# Patient Record
Sex: FEMALE | Race: BLACK OR AFRICAN AMERICAN | Employment: UNEMPLOYED | URBAN - METROPOLITAN AREA
[De-identification: names, ages, dates, MRNs, and addresses within clinical notes are randomized per-mention and may not be internally consistent; named-entity substitution may affect disease eponyms.]

---
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
## 2018-04-19 LAB — GRBS, EXTERNAL: POSITIVE

## 2018-05-02 ENCOUNTER — HOSPITAL ENCOUNTER (EMERGENCY)
Age: 18
Discharge: HOME | End: 2018-05-02
Attending: OBSTETRICS & GYNECOLOGY | Admitting: OBSTETRICS & GYNECOLOGY
Payer: COMMERCIAL

## 2018-05-02 VITALS
WEIGHT: 179 LBS | HEART RATE: 96 BPM | SYSTOLIC BLOOD PRESSURE: 124 MMHG | BODY MASS INDEX: 30.56 KG/M2 | RESPIRATION RATE: 14 BRPM | TEMPERATURE: 97.4 F | HEIGHT: 64 IN | DIASTOLIC BLOOD PRESSURE: 74 MMHG

## 2018-05-02 PROBLEM — O47.1 FALSE LABOR AFTER 37 WEEKS OF GESTATION WITHOUT DELIVERY: Status: ACTIVE | Noted: 2018-05-02

## 2018-05-02 PROCEDURE — 99285 EMERGENCY DEPT VISIT HI MDM: CPT

## 2018-05-02 RX ORDER — VALACYCLOVIR HYDROCHLORIDE 500 MG/1
500 TABLET, FILM COATED ORAL 2 TIMES DAILY
COMMUNITY

## 2018-05-02 NOTE — IP AVS SNAPSHOT
Summary of Care Report The Summary of Care report has been created to help improve care coordination. Users with access to [facility] or [address] (Web-based application) may access additional patient information including the Discharge Summary. If you are not currently a [address] user and need more information, please call the number listed below in the [place] section and ask to be connected with Medical Records. Facility Information Name Address Phone [phone] [place] [id number] [phone] Patient Information Patient Name Sex  Lanie Torres ([id number]) Female 2000 Discharge Information Admitting Provider Service Area Unit Bill Araujo MD / Caitie Capone 15 3 Labor & Delivery / [phone] Discharge Provider Discharge Date/Time Discharge Disposition Destination (none) (none) (none) (none) Patient Language Language ENGLISH [13] Hospital Problems as of 2018  Reviewed: 3/29/2015  9:25 PM by Popeye Joe None Non-Hospital Problems as of 2018  Reviewed: 3/29/2015  9:25 PM by Popeye Joe Class Noted - Resolved Last Modified Active Problems Migraine headache  Unknown - Present 2011 by Marella Client, DO Entered by Marella Client, DO Dermatitis  2011 - Present 2011 by Marella Client, DO Entered by Marella Client, DO  
  Pain, upper back  2011 - Present 2011 by Marella Client, DO Entered by Marella Client, DO Overview Signed 2011  4:03 PM by Marella Client, DO  
   likley due to strain from breasts DUB (dysfunctional uterine bleeding)  2011 - Present 2011 by Marella Client, DO   Entered by Marella Client, DO  
 Overview Signed 2011  4:04 PM by Cesar Rivera, DO  
   possibly due to hormonal imbalance Pseudofolliculitis barbae  7135 - Present 3/29/2015 by Alma Avendano Entered by Alma Avendano You are allergic to the following No active allergies Current Discharge Medication List  
  
ASK your doctor about these medications Dose & Instructions Dispensing Information Comments  
 desonide 0.05 % topical ointment Commonly known as:  Mearl Gin Apply  to affected area two (2) times a day. For up to 2 weeks for itching Quantity:  15 g Refills:  0  
   
 ibuprofen 800 mg tablet Commonly known as:  MOTRIN Dose:  800 mg Take 1 Tab by mouth every eight (8) hours as needed for Pain. For migraines Quantity:  30 Tab Refills:  2  
   
 miconazole 2 % vaginal cream  
Commonly known as:  MONISTAT 7 Apply topically once a day for 7 days Quantity:  45 g Refills:  0  
   
 ondansetron 4 mg disintegrating tablet Commonly known as:  ZOFRAN ODT Dose:  4 mg Take 1 Tab by mouth every eight (8) hours as needed for Nausea. Quantity:  10 Tab Refills:  0  
   
 prenatal multivit-ca-min-fe-fa Tab Commonly known as:  PRENATAL VITAMIN Dose:  1 Tab Take 1 Tab by mouth daily. Take with food Quantity:  30 Tab Refills:  0 VALTREX 500 mg tablet Generic drug:  valACYclovir Dose:  500 mg Take 500 mg by mouth two (2) times a day. Refills:  0 Current Immunizations Name Date HPV (Quad) 2013 Follow-up Information None Discharge Instructions Week 39 of Your Pregnancy: Care Instructions Your Care Instructions During these final weeks, you may feel anxious to see your new baby. [place] babies often look different from what you see in pictures or movies. Right after birth, their heads may have a strange shape.  Their eyes may be puffy. And their genitals may be swollen. They may also have very dry skin, or red marks on the eyelids, nose, or neck. Still, most parents think their babies are beautiful. Follow-up care is a key part of your treatment and safety. Be sure to make and go to all appointments, and call your doctor if you are having problems. It's also a good idea to know your test results and keep a list of the medicines you take. How can you care for yourself at home? Prepare to breastfeed · If you are breastfeeding, continue to eat healthy foods. · Avoid alcohol, cigarettes, and drugs. This includes prescription and over-the-counter medicines. · You can help prevent sore nipples if you feed your baby in the correct position. Nurses will help you learn to do this. · Your  will need to be fed about every 1½ to 3 hours. Choose the right birth control after your baby is born · Women who are breastfeeding can still get pregnant. Use birth control if you don't want to get pregnant. · Intrauterine devices (IUDs) work for women who want to wait at least 2 years before getting pregnant again. They are safe to use while you are breastfeeding. · Depo-Provera can be used while you are breastfeeding. It is a shot you get every 3 months. · Birth control pills work well. But you need a different kind of pill while you are breastfeeding. And when you start taking these pills, you need to make sure to use another type of birth control until you start your second pack. · Diaphragms, cervical caps, tubal implants, and condoms with spermicide work less well after birth. If you have a diaphragm or cervical cap, you will need to have it refitted. · Tubal ligation (tying your tubes) and vasectomy are both permanent. These are good options if you are sure you are done having children. Where can you learn more? Go to http://hernán-rj.info/. Enter V267 in the search box to learn more about \"Week 39 of Your Pregnancy: Care Instructions. \" Current as of: March 16, 2017 Content Version: 11.4 © 4880-9645 [employer]. Care instructions adapted under license by [employer] (which disclaims liability or warranty for this information). If you have questions about a medical condition or this instruction, always ask your healthcare professional. [place] [address] any warranty or liability for your use of this information. Chart Review Routing History No Routing History on File

## 2018-05-02 NOTE — IP AVS SNAPSHOT
[address] [facility] [address] [place] [address] 
[phone] Patient: Robert Alvarez MRN: [id number] :2000 About your hospitalization You were admitted on:  N/A You last received care in the:  [facility] 3 LABOR & DELIVERY You were discharged on:  May 2, 2018 Why you were hospitalized Your primary diagnosis was:  Not on File Follow-up Information None Discharge Orders None A check cuco indicates which time of day the medication should be taken. My Medications ASK your doctor about these medications Instructions Each Dose to Equal  
 Morning Noon Evening Bedtime  
 desonide 0.05 % topical ointment Commonly known as:  Gale Renato Your last dose was: Your next dose is:    
   
   
 Apply  to affected area two (2) times a day. For up to 2 weeks for itching  
     
   
   
   
  
 ibuprofen 800 mg tablet Commonly known as:  MOTRIN Your last dose was: Your next dose is: Take 1 Tab by mouth every eight (8) hours as needed for Pain. For migraines 800 mg  
    
   
   
   
  
 miconazole 2 % vaginal cream  
Commonly known as:  MONISTAT 7 Your last dose was: Your next dose is:    
   
   
 Apply topically once a day for 7 days  
     
   
   
   
  
 ondansetron 4 mg disintegrating tablet Commonly known as:  ZOFRAN ODT Your last dose was: Your next dose is: Take 1 Tab by mouth every eight (8) hours as needed for Nausea. 4 mg  
    
   
   
   
  
 prenatal multivit-ca-min-fe-fa Tab Commonly known as:  PRENATAL VITAMIN Your last dose was: Your next dose is: Take 1 Tab by mouth daily. Take with food 1 Tab VALTREX 500 mg tablet Generic drug:  valACYclovir Your last dose was: Your next dose is: Take 500 mg by mouth two (2) times a day.   
 500 mg  
    
 Discharge Instructions Week 39 of Your Pregnancy: Care Instructions Your Care Instructions During these final weeks, you may feel anxious to see your new baby. Manor babies often look different from what you see in pictures or movies. Right after birth, their heads may have a strange shape. Their eyes may be puffy. And their genitals may be swollen. They may also have very dry skin, or red marks on the eyelids, nose, or neck. Still, most parents think their babies are beautiful. Follow-up care is a key part of your treatment and safety. Be sure to make and go to all appointments, and call your doctor if you are having problems. It's also a good idea to know your test results and keep a list of the medicines you take. How can you care for yourself at home? Prepare to breastfeed · If you are breastfeeding, continue to eat healthy foods. · Avoid alcohol, cigarettes, and drugs. This includes prescription and over-the-counter medicines. · You can help prevent sore nipples if you feed your baby in the correct position. Nurses will help you learn to do this. · Your  will need to be fed about every 1½ to 3 hours. Choose the right birth control after your baby is born · Women who are breastfeeding can still get pregnant. Use birth control if you don't want to get pregnant. · Intrauterine devices (IUDs) work for women who want to wait at least 2 years before getting pregnant again. They are safe to use while you are breastfeeding. · Depo-Provera can be used while you are breastfeeding. It is a shot you get every 3 months. · Birth control pills work well. But you need a different kind of pill while you are breastfeeding. And when you start taking these pills, you need to make sure to use another type of birth control until you start your second pack.  
· Diaphragms, cervical caps, tubal implants, and condoms with spermicide work less well after birth. If you have a diaphragm or cervical cap, you will need to have it refitted. · Tubal ligation (tying your tubes) and vasectomy are both permanent. These are good options if you are sure you are done having children. Where can you learn more? Go to http://hernán-rj.info/. Enter D900 in the search box to learn more about \"Week 39 of Your Pregnancy: Care Instructions. \" Current as of: March 16, 2017 Content Version: 11.4 © 2422-3407 [employer]. Care instructions adapted under license by [employer] (which disclaims liability or warranty for this information). If you have questions about a medical condition or this instruction, always ask your healthcare professional. [address] any warranty or liability for your use of this information. Introducing [facility]! [facility] introduces [employer] patient portal. Now you can access parts of your medical record, email your doctor's office, and request medication refills online. 1. In your internet browser, go to [email or web address] 2. Click on the First Time User? Click Here link in the Sign In box. You will see the New Member Sign Up page. 3. Enter your [employer] Access Code exactly as it appears below. You will not need to use this code after youve completed the sign-up process. If you do not sign up before the expiration date, you must request a new code. · [employer] Access Code: [email or web address] Expires: 7/9/2018  2:49 PM 
 
4. Enter the last four digits of your Social Security Number (xxxx) and Date of Birth (mm/dd/yyyy) as indicated and click Submit. You will be taken to the next sign-up page. 5. Create a TaoTaoSout ID. This will be your [employer] login ID and cannot be changed, so think of one that is secure and easy to remember. 6. Create a TaoTaoSout password. You can change your password at any time. 7. Enter your Password Reset Question and Answer. This can be used at a later time if you forget your password. 8. Enter your e-mail address. You will receive e-mail notification when new information is available in [address] 9. Click Sign Up. You can now view and download portions of your medical record. 10. Click the Download Summary menu link to download a portable copy of your medical information. If you have questions, please visit the Frequently Asked Questions section of the [employer] website. Remember, [employer] is NOT to be used for urgent needs. For medical emergencies, dial 911. Now available from your iPhone and Android! Introducing Branden Mantilla As a [employer] patient, I wanted to make you aware of our electronic visit tool called Branden Mantilla. [employer] 24/7 allows you to connect within minutes with a medical provider 24 hours a day, seven days a week via a mobile device or tablet or logging into a secure website from your computer. You can access Branden Mantilla from anywhere in the [place]. A virtual visit might be right for you when you have a simple condition and feel like you just dont want to get out of bed, or cant get away from work for an appointment, when your regular [employer] provider is not available (evenings, weekends or holidays), or when youre out of town and need minor care. Electronic visits cost only $49 and if the [employer] 24/7 provider determines a prescription is needed to treat your condition, one can be electronically transmitted to a nearby pharmacy*. Please take a moment to enroll today if you have not already done so. The enrollment process is free and takes just a few minutes. To enroll, please download the [employer] 24/7 alex to your tablet or phone, or visit [email or web address] to enroll on your computer.    
And, as an [address] [place] [address] patient with a [employer] account, the results of your visits will be scanned into your electronic medical record and your primary care provider will be able to view the scanned results. We urge you to continue to see your regular [facility] provider for your ongoing medical care. And while your primary care provider may not be the one available when you seek a Branden Tobinfin virtual visit, the peace of mind you get from getting a real diagnosis real time can be priceless. For more information on [employer], view our Frequently Asked Questions (FAQs) at [email or web address]. Sincerely, 
 
Dianah Sicard, MD 
Chief Medical Officer Leandra Cooley *:  certain medications cannot be prescribed via [employer] Providers Seen During Your Hospitalization Provider Specialty Primary office phone Jennie Carballo MD Obstetrics & Gynecology [phone] Makayla Farrell MD Obstetrics & Gynecology [phone] Your Primary Care Physician (PCP) Primary Care Physician Office Phone Office Fax NONE ** None ** ** None ** You are allergic to the following No active allergies Recent Documentation Height Weight Breastfeeding? BMI OB Status Smoking Status 1.626 m (45 %, Z= -0.12)* 81.2 kg (94 %, Z= 1.58)* No 30.73 kg/m2 (94 %, Z= 1.57)* Pregnant Light Tobacco Smoker *Growth percentiles are based on CDC 2-20 Years data. Emergency Contacts Name Discharge Info Relation Home Work Mobile PabloTim N/A  AT THIS TIME [6] Other Relative [6] [phone] [facility] N/A  AT THIS TIME [6] Parent [1] [phone] Patient Belongings The following personal items are in your possession at time of discharge: 
  Dental Appliances: None  Visual Aid: At bedside, Glasses      Home Medications: None   Jewelry: Body Piercing, With patient  Clothing: At bedside    Other Valuables: None  Personal Items Sent to Safe: None Please provide this summary of care documentation to your next provider. Signatures-by signing, you are acknowledging that this After Visit Summary has been reviewed with you and you have received a copy. Patient Signature:  ____________________________________________________________ Date:  ____________________________________________________________  
  
Annabelle Pane Provider Signature:  ____________________________________________________________ Date:  ____________________________________________________________

## 2018-05-02 NOTE — H&P
Labor and Delivery Admission Note  5/2/2018    CC: More painful contractions    21 y.o., , female, G1 P 0 @ 38 6/7 wks with Estimated Date of Delivery: 5/10/18 by dates and US presents at 1522 with report of contractions. She states contractions started a couple of days ago but then increased today starting around 11. They are still irregular, but initially were q 2 hours, then 1, now 5-13 minutes. She has had no LOF, VB, decreased FM or changes in her discharge. This pregnancy has been supervised by Dr. Baljeet Louise and has been uncomplicated with exception of history HSV with outbreak @ 35 wks. She has been on Valtrex since 36 weeks and has not had any outbreaks or prodromal symptoms since. PNC: Blood type: O            RH: pos            Ab screen: negative            Rubella: immune            RPR: nonreactive            1 hr GTT: 75            HIV: non reactive            Hgb electrophoresis: AA            GC/Chlamydia: negative            Urine culture: E. Coli (9/14/17)            SVII serology: positive history             GBS status: positive    PMH:  H/o seizure @ 6yo x 1; no diagnosis of epilepsy or need for treatment; thought to be stress related    PSH:  Adenoidectomy            Right finger fracture repair    OB:  G1 current    GYN:  H/o HSV; no previous paps due to age    SH:   Former smoker, quit at start of pregnancy; denies alcohol and drug use      Past Medical History:   Diagnosis Date    Back pain     Epilepsy ([address]     childhood    Fracture of finger of right hand 2014    Genital herpes     Herpes gestationis     outbreak at 35 weeks    Large breasts     Migraine headache     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 Meds:  Valtrex q day              PNV    Allergies: No Known Allergies     Pertinent ROS: as per HPI o/w negative     Family History   Problem Relation Age of Onset    Migraines Mother     Hypertension Maternal Grandmother     Diabetes Maternal Grandmother     Hypertension Paternal Grandmother     Psychiatric Disorder Paternal Aunt      Social History     Social History    Marital status: SINGLE     Spouse name: N/A    Number of children: N/A    Years of education: N/A     Occupational History    Not on file. Social History Main Topics    Smoking status: Light Tobacco Smoker    Smokeless tobacco: Current User      Comment: lives with smoker grandma x 1 yr    Alcohol use No    Drug use: No    Sexual activity: No     Other Topics Concern    Not on file     Social History Narrative       OBJECTIVE:  Gravid , female NAD  No data recorded. Visit Vitals    Ht 5' 4\" (1.626 m)    Wt 81.2 kg (179 lb)    LMP 06/30/2017    Breastfeeding No    BMI 30.73 kg/m2       Labs:  No results found for: WBC    Exam:  HEENT:  normal   Lungs:  clear  Cor:  RRR  Abdomen:  Soft, gravid, nontender; EFW 7#  Fetal heart rate tracing:  Baseline 130, moderate variability, accels present, decels absent  Contraction pattern: irregular, q 3-10 minutes  Cervix:  1/60/-2/soft/Vtx (essentially unchanged from the office)  Fluid:  Intact  SPEC EXAM:  No HSV lesions seen  Pelvimetry:  AP-good                      Arch- adequate                      Sidewalls- adequate                      Pelvis feels adequate for fetus. Impression:  IUP at 38 6/7 weeks with irregular contractions; no evidence of labor; HSV without outbreaks.     Plan: Discharge home           Labor precautions     Irving Archer MD

## 2018-05-02 NOTE — PROGRESS NOTES
1522: Assumed care of patient. Patient ambulatory to room 7. She complains of contractions that started a few days ago but have gotten stronger and closer together since 1100 today. Placed on monitors. Patient of Dr. Amelia Marcus. Spoke with Dr. uSzanne Ramirez who has asked that Dr. Paradise Pineda assume care of patient. Prenatal labs received from office. 1612: Dr. Paradise Pineda at bedside discussing [address] [facility] [address].

## 2018-05-08 ENCOUNTER — HOSPITAL ENCOUNTER (INPATIENT)
Age: 18
LOS: 3 days | Discharge: HOME | End: 2018-05-11
Attending: OBSTETRICS & GYNECOLOGY | Admitting: OBSTETRICS & GYNECOLOGY
Payer: COMMERCIAL

## 2018-05-08 DIAGNOSIS — R52 PAIN: Primary | ICD-10-CM

## 2018-05-08 PROBLEM — O47.9 UTERINE CONTRACTIONS DURING PREGNANCY: Status: ACTIVE | Noted: 2018-05-08

## 2018-05-08 LAB
ERYTHROCYTE [DISTWIDTH] IN BLOOD BY AUTOMATED COUNT: 13.2 % (ref 11.5–14.5)
HCT VFR BLD AUTO: 35.1 % (ref 35–47)
HGB BLD-MCNC: 12.1 G/DL (ref 11.5–16)
MCH RBC QN AUTO: 32.5 PG (ref 26–34)
MCHC RBC AUTO-ENTMCNC: 34.5 G/DL (ref 30–36.5)
MCV RBC AUTO: 94.4 FL (ref 80–99)
NRBC # BLD: 0 K/UL (ref 0–0.01)
NRBC BLD-RTO: 0 PER 100 WBC
PLATELET # BLD AUTO: 166 K/UL (ref 150–400)
PMV BLD AUTO: 11.4 FL (ref 8.9–12.9)
RBC # BLD AUTO: 3.72 M/UL (ref 3.8–5.2)
WBC # BLD AUTO: 9.7 K/UL (ref 3.6–11)

## 2018-05-08 PROCEDURE — 74011250636 HC RX REV CODE- 250/636: Performed by: OBSTETRICS & GYNECOLOGY

## 2018-05-08 PROCEDURE — 65270000029 HC RM PRIVATE

## 2018-05-08 PROCEDURE — 10907ZC DRAINAGE OF AMNIOTIC FLUID, THERAPEUTIC FROM PRODUCTS OF CONCEPTION, VIA NATURAL OR ARTIFICIAL OPENING: ICD-10-PCS | Performed by: OBSTETRICS & GYNECOLOGY

## 2018-05-08 PROCEDURE — 75410000002 HC LABOR FEE PER 1 HR

## 2018-05-08 PROCEDURE — 36415 COLL VENOUS BLD VENIPUNCTURE: CPT | Performed by: OBSTETRICS & GYNECOLOGY

## 2018-05-08 PROCEDURE — 85027 COMPLETE CBC AUTOMATED: CPT | Performed by: OBSTETRICS & GYNECOLOGY

## 2018-05-08 PROCEDURE — 74011000258 HC RX REV CODE- 258: Performed by: OBSTETRICS & GYNECOLOGY

## 2018-05-08 PROCEDURE — 99285 EMERGENCY DEPT VISIT HI MDM: CPT

## 2018-05-08 RX ORDER — SODIUM CHLORIDE 0.9 % (FLUSH) 0.9 %
SYRINGE (ML) INJECTION
Status: DISPENSED
Start: 2018-05-08 | End: 2018-05-09

## 2018-05-08 RX ORDER — FENTANYL CITRATE 50 UG/ML
100 INJECTION, SOLUTION INTRAMUSCULAR; INTRAVENOUS
Status: DISCONTINUED | OUTPATIENT
Start: 2018-05-08 | End: 2018-05-09 | Stop reason: HOSPADM

## 2018-05-08 RX ORDER — TERBUTALINE SULFATE 1 MG/ML
0.25 INJECTION SUBCUTANEOUS AS NEEDED
Status: DISCONTINUED | OUTPATIENT
Start: 2018-05-08 | End: 2018-05-09 | Stop reason: HOSPADM

## 2018-05-08 RX ORDER — NALBUPHINE HYDROCHLORIDE 10 MG/ML
10 INJECTION, SOLUTION INTRAMUSCULAR; INTRAVENOUS; SUBCUTANEOUS
Status: DISCONTINUED | OUTPATIENT
Start: 2018-05-08 | End: 2018-05-09 | Stop reason: HOSPADM

## 2018-05-08 RX ADMIN — PENICILLIN G POTASSIUM 2.5 MILLION UNITS: 20000000 POWDER, FOR SOLUTION INTRAVENOUS at 19:23

## 2018-05-08 RX ADMIN — SODIUM CHLORIDE 5 MILLION UNITS: 900 INJECTION, SOLUTION INTRAVENOUS at 15:39

## 2018-05-08 RX ADMIN — PENICILLIN G POTASSIUM 2.5 MILLION UNITS: 20000000 POWDER, FOR SOLUTION INTRAVENOUS at 23:21

## 2018-05-08 NOTE — IP AVS SNAPSHOT
[address] [place] [address] [address] [place] 
[phone] Patient: Milady Pinedo MRN: [id number] :2000 About your hospitalization You were admitted on: May 8, 2018 You last received care in the:  [address] [facility] You were discharged on:  May 11, 2018 Why you were hospitalized Your primary diagnosis was:  Uterine Contractions During Pregnancy Your diagnoses also included:  Pain Follow-up Information Follow up With Details Comments Contact Info Angle Recinos MD Schedule an appointment as soon as possible for a visit in 2 weeks Routine postpartum follow-up appointment [address] [place] [address], [address] [employer] [address] [place] 
[phone] Discharge Orders None A check cuco indicates which time of day the medication should be taken. My Medications START taking these medications Instructions Each Dose to Equal  
 Morning Noon Evening Bedtime  
 ibuprofen 800 mg tablet Commonly known as:  MOTRIN Your last dose was: Your next dose is: Take 1 Tab by mouth every eight (8) hours. Indications: perineal pain 800 mg  
    
   
   
   
  
 oxyCODONE-acetaminophen 5-325 mg per tablet Commonly known as:  PERCOCET Your last dose was: Your next dose is: Take 1 Tab by mouth every four (4) hours as needed for Pain. Max Daily Amount: 6 Tabs. Indications: Pain, postpartum 1 Tab CONTINUE taking these medications Instructions Each Dose to Equal  
 Morning Noon Evening Bedtime  
 prenatal multivit-ca-min-fe-fa Tab Commonly known as:  PRENATAL VITAMIN Your last dose was: Your next dose is: Take 1 Tab by mouth daily. Take with food 1 Tab VALTREX 500 mg tablet Generic drug:  valACYclovir Your last dose was: Your next dose is: Take 500 mg by mouth two (2) times a day. 500 mg Where to Get Your Medications Information on where to get these meds will be given to you by the nurse or doctor. ! Ask your nurse or doctor about these medications  
  ibuprofen 800 mg tablet  
 oxyCODONE-acetaminophen 5-325 mg per tablet Opioid Education Prescription Opioids: What You Need to Know: 
 
Prescription opioids can be used to help relieve moderate-to-severe pain and are often prescribed following a surgery or injury, or for certain health conditions. These medications can be an important part of treatment but also come with serious risks. Opioids are strong pain medicines. Examples include hydrocodone, oxycodone, fentanyl, and morphine. Heroin is an example of an illegal opioid. It is important to work with your health care provider to make sure you are getting the safest, most effective care. WHAT ARE THE RISKS AND SIDE EFFECTS OF OPIOID USE? Prescription opioids carry serious risks of addiction and overdose, especially with prolonged use. An opioid overdose, often marked by slow breathing, can cause sudden death. The use of prescription opioids can have a number of side effects as well, even when taken as directed. · Tolerance-meaning you might need to take more of a medication for the same pain relief · Physical dependence-meaning you have symptoms of withdrawal when the medication is stopped. Withdrawal symptoms can include nausea, sweating, chills, diarrhea, stomach cramps, and muscle aches. Withdrawal can last up to several weeks, depending on which drug you took and how long you took it. · Increased sensitivity to pain · Constipation · Nausea, vomiting, and dry mouth · Sleepiness and dizziness · Confusion · Depression · Low levels of testosterone that can result in lower sex drive, energy, and strength · Itching and sweating RISKS ARE GREATER WITH:      
· History of drug misuse, substance use disorder, or overdose · Mental health conditions (such as depression or anxiety) · Sleep apnea · Older age (72 years or older) · Pregnancy Avoid alcohol while taking prescription opioids. Also, unless specifically advised by your health care provider, medications to avoid include: · Benzodiazepines (such as Xanax or Valium) · Muscle relaxants (such as Soma or Flexeril) · Hypnotics (such as Ambien or Lunesta) · Other prescription opioids KNOW YOUR OPTIONS Talk to your health care provider about ways to manage your pain that don't involve prescription opioids. Some of these options may actually work better and have fewer risks and side effects. Options may include: 
· Pain relievers such as acetaminophen, ibuprofen, and naproxen · Some medications that are also used for depression or seizures · Physical therapy and exercise · Counseling to help patients learn how to cope better with triggers of pain and stress. · Application of heat or cold compress · Massage therapy · Relaxation techniques Be Informed Make sure you know the name of your medication, how much and how often to take it, and its potential risks & side effects. IF YOU ARE PRESCRIBED OPIOIDS FOR PAIN: 
· Never take opioids in greater amounts or more often than prescribed. Remember the goal is not to be pain-free but to manage your pain at a tolerable level. · Follow up with your primary care provider to: · Work together to create a plan on how to manage your pain. · Talk about ways to help manage your pain that don't involve prescription opioids. · Talk about any and all concerns and side effects. · Help prevent misuse and abuse. · Never sell or share prescription opioids · Help prevent misuse and abuse. · Store prescription opioids in a secure place and out of reach of others (this may include visitors, children, friends, and family). · Safely dispose of unused/unwanted prescription opioids: Find your community drug take-back program or your pharmacy mail-back program, or flush them down the toilet, following guidance from the Food and Drug Administration (www.fda.gov/Drugs/ResourcesForYou). · Visit www.cdc.gov/drugoverdose to learn about the risks of opioid abuse and overdose. · If you believe you may be struggling with addiction, tell your health care provider and ask for guidance or call [employer] at 0-012-009-MLZJ. Discharge Instructions POSTPARTUM DISCHARGE INSTRUCTIONS Name:  Samantha Villegas YOB: 2000 Admission Diagnosis:  Labor, prolonged latent phase Discharge Diagnosis:   
Problem List as of 5/11/2018  Date Reviewed: 5/8/2018 Codes Class Noted - Resolved Pain ICD-10-CM: R52 ICD-9-CM: 780.96  5/11/2018 - Present Overview Signed 5/11/2018  9:46 AM by Moises Suggs MD  
  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  
  likley due to strain from breasts DUB (dysfunctional uterine bleeding) ICD-10-CM: N93.8 ICD-9-CM: 626.8  6/28/2011 - Present Overview Signed 6/28/2011  4:04 PM by Baltazar Mcdonnell DO  
  possibly due to hormonal imbalance Attending Physician:  Ceferino Marshall MD 
 
 Delivery Type:  Vaginal Childbirth with Episiotomy, Laceration or Tear: What To Expect At [address] [facility] Your body will slowly heal in the next few weeks. It is easy to get too tired and overwhelmed during the first weeks after your baby is born. Changes in your hormones can shift your mood without warning. You may find it hard to meet the extra demands on your energy and time. Take it easy on yourself. Follow-up care is a key part of your treatment and safety. Be sure to make and go to all appointments, and call your doctor if you are having problems. It's also a good idea to know your test results and keep a list of the medicines you take. How can you care for yourself at home? Vaginal Bleeding and Cramps · After delivery, you will have a bloody discharge from the vagina. This will turn pink within a week and then white or yellow after about 10 days. It may last for 2 to 4 weeks or longer, until the uterus has healed. Use pads instead of tampons until you stop bleeding. · Do not worry if you pass some blood clots, as long as they are smaller than a golf ball. If you have a tear or stitches in your vaginal area, change the pad at least every 4 hours to prevent soreness and infection. · You may have cramps for the first few days after childbirth. These are normal and occur as the uterus shrinks to normal size. Take an over-the-counter pain medicine, such as acetaminophen (Tylenol), ibuprofen (Advil, Motrin), or naproxen (Aleve), for cramps. Read and follow all instructions on the label. Do not take aspirin, because it can cause more bleeding. Do not take acetaminophen (Tylenol) and other acetaminophen containing medications (i.e. Percocet) at the same time. Episiotomy, Lacerations or Tears · If you have stitches, they will dissolve on their own and do not need to be removed.   
· Put ice or a cold pack on your painful area for 10 to 20 minutes at a time, several times a day, for the first few days. Put a thin cloth between the ice and your skin. · Sit in a few inches of warm water (sitz bath) 3 times a day and after bowel movements. The warm water helps with pain and itching. If you do not have a tub, a warm shower might help. Breast fullness · Your breasts may overfill (engorge) in the first few days after delivery. To help milk flow and to relieve pain, warm your breasts in the shower or by using warm, moist towels before nursing. · If you are not nursing, do not put warmth on your breasts or touch your breasts. Wear a tight bra or sports bra and use ice until the fullness goes away. This usually takes 2 to 3 days. · Put ice or a cold pack on your breast after nursing to reduce swelling and pain. Put a thin cloth between the ice and your skin. Activity · Eat a balanced diet. Do not try to lose weight by cutting calories. Keep taking your prenatal vitamins, or take a multivitamin. · Get as much rest as you can. Try to take naps when your baby sleeps during the day. · Get some exercise every day. But do not do any heavy exercise until your doctor says it is okay. · Wait until you are healed (about 4 to 6 weeks) before you have sexual intercourse. Your doctor will tell you when it is okay to have sex. · Talk to your doctor about birth control. You can get pregnant even before your period returns. Also, you can get pregnant while you are breast-feeding. Mental Health · Many women get the \"baby blues\" during the first few days after childbirth. You may lose sleep, feel irritable, and cry easily. You may feel happy one minute and sad the next. Hormone changes are one cause of these emotional changes. Also, the demands of a new baby, along with visits from relatives or other family needs, add to a mother's stress. The \"baby blues\" often peak around the fourth day. Then they ease up in less than 2 weeks. · If your moodiness or anxiety lasts for more than 2 weeks, or if you feel like life is not worth living, you may have postpartum depression. This is different for each mother. Some mothers with serious depression may worry intensely about their infant's well-being. Others may feel distant from their child. Some mothers might even feel that they might harm their baby. A mother may have signs of paranoia, wondering if someone is watching her. · With all the changes in your life, you may not know if you are depressed. Pregnancy sometimes causes changes in how you feel that are similar to the symptoms of depression. · Symptoms of depression include: · Feeling sad or hopeless and losing interest in daily activities. These are the most common symptoms of depression. · Sleeping too much or not enough. · Feeling tired. You may feel as if you have no energy. · Eating too much or too little. · POSTPARTUM SUPPORT INTERNATIONAL (PSI) offers a Warm line; Chat with the Expert phone sessions; Information and Articles about Pregnancy and Postpartum Mood Disorders; Comprehensive List of Free Support Groups; Knowledgeable local coordinators who will offer support, information, and resources; Guide to Resources on [place]; Calendar of events in the  mood disorders community; Latest News and Research; and [facility] [address] for [employer]. Remember - You are not alone; You are not to blame; With help, you will be well. 4-521-979-PPD(2371). WWW. POSTPARTUM. NET · Writing or talking about death, such as writing suicide notes or talking about guns, knives, or pills. Keep the numbers for these national suicide hotlines: 1-254-503-TALK (9-571.395.8722) and 5-825-BXZNWOF (3-115-073-738.604.3412). If you or someone you know talks about suicide or feeling hopeless, get help right away. Constipation and Hemorrhoids Drink plenty of fluids, enough so that your urine is light yellow or clear like water. If you have kidney, heart, or liver disease and have to limit fluids, talk with your doctor before you increase the amount of fluids you drink. · Eat plenty of fiber each day. Have a bran muffin or bran cereal for breakfast, and try eating a piece of fruit for a mid-afternoon snack. · For painful, itchy hemorrhoids, put ice or a cold pack on the area several times a day for 10 minutes at a time. Follow this by putting a warm compress on the area for another 10 to 20 minutes or by sitting in a shallow, warm bath. When should you call for help? Call 911 anytime you think you may need emergency care. For example, call if: 
· You are thinking of hurting yourself, your baby, or anyone else. · You passed out (lost consciousness). · You have symptoms of a blood clot in your lung (called a pulmonary embolism). These may include: 
· Sudden chest pain. · Trouble breathing. · Coughing up blood. Call your doctor now or seek immediate medical care if: 
· You have severe vaginal bleeding. · You are soaking through a pad each hour for 2 or more hours. · Your vaginal bleeding seems to be getting heavier or is still bright red 4 days after delivery. · You are dizzy or lightheaded, or you feel like you may faint. · You are vomiting or cannot keep fluids down. · You have a fever. · You have new or more belly pain. · You pass tissue (not just blood). · Your vaginal discharge smells bad. · Your belly feels tender or full and hard. · Your breasts are continuously painful or red. · You feel sad, anxious, or hopeless for more than a few days. · You have sudden, severe pain in your belly. · You have symptoms of a blood clot in your leg (called a deep vein thrombosis), such as: 
· Pain in your calf, back of the knee, thigh, or groin. · Redness and swelling in your leg or groin. · You have symptoms of preeclampsia, such as: 
· Sudden swelling of your face, hands, or feet. · New vision problems (such as dimness or blurring). · A severe headache. · Your blood pressure is higher than it should be or rises suddenly. · You have new nausea or vomiting. Watch closely for changes in your health, and be sure to contact your doctor if you have any problems. Additional Information:  Postpartum Support PARENTS:  Are you feeling sad or depressed? Is it difficult for you to enjoy yourself? Do you feel more irritable or tense? Do you feel anxious or panicky? Are you having difficulty bonding with your baby? Do you feel as if you are \"out of control\" or \"going crazy\"? Are you worried that you might hurt your baby or yourself? FAMILIES: Do you worry that something is wrong but don't know how to help? Do you think that your partner or spouse is having problems coping? Are you worried that it may never get better? While many women experience some mild mood change or \"the blues\" during or after the birth of a child, 1 in 9 women experience more significant symptoms of depression or anxiety. 1 in 10 Dads become depressed during the first year. Things you can do Being a good parent includes taking care of yourself. If you take care of yourself, you will be able to take better care of your baby and your family. · Talk to a counselor or healthcare provider who has training in  mood and anxiety problems. · Learn as much as you can about pregnancy and postpartum depression and anxiety. · Get support from family and friends. Ask for help when you need it. · Join a support group in your area or online. · Keep active by walking, stretching or whatever form of exercise helps you to feel better. · Get enough rest and time for yourself. · Eat a healthy diet. · Don't give up! It may take more than one try to get the right help you need. These are general instructions for a healthy lifestyle: No smoking/ No tobacco products/ Avoid exposure to second hand smoke Surgeon General's Warning:  Quitting smoking now greatly reduces serious risk to your health. Obesity, smoking, and sedentary lifestyle greatly increases your risk for illness A healthy diet, regular physical exercise & weight monitoring are important for maintaining a healthy lifestyle Recognize signs and symptoms of STROKE: 
 
F-face looks uneven A-arms unable to move or move unevenly S-speech slurred or non-existent T-time-call 911 as soon as signs and symptoms begin - DO NOT go  
    back to bed or wait to see if you get better - TIME IS BRAIN. I have had the opportunity to make my options or choices for discharge. I have received and understand these instructions. "[employer] Announcement We are excited to announce that we are making your provider's discharge notes available to you in "[employer]. You will see these notes when they are completed and signed by the physician that discharged you from your recent hospital stay. If you have any questions or concerns about any information you see in "[employer] please call the Health Information Department where you were seen or reach out to your Primary Care Provider for more information about your plan of care. Introducing [facility]! Anjelica Astudillo introduces "[employer] patient portal. Now you can access parts of your medical record, email your doctor's office, and request medication refills online. 1. In your internet browser, go to [email or web address] 2. Click on the First Time User? Click Here link in the Sign In box. You will see the New Member Sign Up page. 3. Enter your "[employer] Access Code exactly as it appears below. You will not need to use this code after youve completed the sign-up process. If you do not sign up before the expiration date, you must request a new code. · "[employer] Access Code: [email or web address] Expires: 7/9/2018  2:49 PM 
 
 4. Enter the last four digits of your Social Security Number (xxxx) and Date of Birth (mm/dd/yyyy) as indicated and click Submit. You will be taken to the next sign-up page. 5. Create a GoChime ID. This will be your [employer] login ID and cannot be changed, so think of one that is secure and easy to remember. 6. Create a [employer] password. You can change your password at any time. 7. Enter your Password Reset Question and Answer. This can be used at a later time if you forget your password. 8. Enter your e-mail address. You will receive e-mail notification when new information is available in [address] 9. Click Sign Up. You can now view and download portions of your medical record. 10. Click the Download Summary menu link to download a portable copy of your medical information. If you have questions, please visit the Frequently Asked Questions section of the [employer] website. Remember, [employer] is NOT to be used for urgent needs. For medical emergencies, dial 911. Now available from your iPhone and Android! Introducing Branden Mantilla As a [facility] patient, I wanted to make you aware of our electronic visit tool called [facility]. [facility] 24/7 allows you to connect within minutes with a medical provider 24 hours a day, seven days a week via a mobile device or tablet or logging into a secure website from your computer. You can access Branden Mantilla from anywhere in the [place]. A virtual visit might be right for you when you have a simple condition and feel like you just dont want to get out of bed, or cant get away from work for an appointment, when your regular [facility] provider is not available (evenings, weekends or holidays), or when youre out of town and need minor care.   Electronic visits cost only $49 and if the Branden Mantilla provider determines a prescription is needed to treat your condition, one can be electronically transmitted to a nearby pharmacy*. Please take a moment to enroll today if you have not already done so. The enrollment process is free and takes just a few minutes. To enroll, please download the [facility] 24/7 alex to your tablet or phone, or visit [email or web address] to enroll on your computer. And, as an [address] [place] [address] patient with a [employer] account, the results of your visits will be scanned into your electronic medical record and your primary care provider will be able to view the scanned results. We urge you to continue to see your regular [facility] provider for your ongoing medical care. And while your primary care provider may not be the one available when you seek a [facility] virtual visit, the peace of mind you get from getting a real diagnosis real time can be priceless. For more information on [facility], view our Frequently Asked Questions (FAQs) at [email or web address]. Sincerely, 
 
Ignacia Verma MD 
Chief Medical Officer [facility] Crystal Yasir *:  certain medications cannot be prescribed via [facility] Providers Seen During Your Hospitalization Provider Specialty Primary office phone Miriam Mittal MD Obstetrics & Gynecology [phone] Your Primary Care Physician (PCP) Primary Care Physician Office Phone Office Fax Nirmal Cheung [phone] You are allergic to the following No active allergies Recent Documentation Height Weight Breastfeeding? BMI OB Status Smoking Status 1.651 m (61 %, Z= 0.27)* 81.2 kg (94 %, Z= 1.57)* Unknown 29.79 kg/m2 (93 %, Z= 1.47)* Recent pregnancy Light Tobacco Smoker *Growth percentiles are based on CDC 2-20 Years data. Emergency Contacts Name Discharge Info Relation Home Work Mobile Tim Shah (bozena) YES [1] Other Relative [6] [phone] Atilio Cordova (Mother) YES [1] Parent [1] [phone] Oval Ricks  Boyfriend [17]   [phone] Patient Belongings The following personal items are in your possession at time of discharge: 
  Dental Appliances: None  Visual Aid: None      Home Medications: None   Jewelry: None  Clothing: At bedside    Other Valuables: At bedside  Personal Items Sent to Safe: None Please provide this summary of care documentation to your next provider. Signatures-by signing, you are acknowledging that this After Visit Summary has been reviewed with you and you have received a copy. Patient Signature:  ____________________________________________________________ Date:  ____________________________________________________________  
  
[facility] Provider Signature:  ____________________________________________________________ Date:  ____________________________________________________________

## 2018-05-08 NOTE — PROGRESS NOTES
1130: Assumed care of patient. Patient is here with complaints of contractions that have gotten stronger and are happening every 5 minutes. Denies a \"gush if fluid\" but states she is having a clear discharge. Fetal movement is present. 1220: Dr. Paradise Pineda at bedside discussing [address]. Plan is for patient to ambulate for 2 hours and recheck cervix at 1430. Ultrasound confirmed vertex. 1226: Patient off monitors to ambulate. 1430: Patient back from walk. Placed on monitors. Will let Dr. Paradise Pineda know. 1452: Dr. Paradise Pineda at bedside. Plan to admit patient and start GBS prophylaxis. 1551: On birthing ball    1622: Off monitors to ambulate. 1830: Patient ambulating around unit. 1930: Bedside, Verbal and Written shift change report given to Raysa Dunaway RN (oncoming nurse) by ROLANDO Fleming RN (offgoing nurse). Report included the following information SBAR, MAR and Recent Results.

## 2018-05-08 NOTE — IP AVS SNAPSHOT
[address] 
[phone] Patient: Ninfa Driver MRN: [id number] :2000 A check cuco indicates which time of day the medication should be taken. My Medications START taking these medications Instructions Each Dose to Equal  
 Morning Noon Evening Bedtime  
 ibuprofen 800 mg tablet Commonly known as:  MOTRIN Your last dose was: Your next dose is: Take 1 Tab by mouth every eight (8) hours. Indications: perineal pain 800 mg  
    
   
   
   
  
 oxyCODONE-acetaminophen 5-325 mg per tablet Commonly known as:  PERCOCET Your last dose was: Your next dose is: Take 1 Tab by mouth every four (4) hours as needed for Pain. Max Daily Amount: 6 Tabs. Indications: Pain, postpartum 1 Tab CONTINUE taking these medications Instructions Each Dose to Equal  
 Morning Noon Evening Bedtime  
 prenatal multivit-ca-min-fe-fa Tab Commonly known as:  PRENATAL VITAMIN Your last dose was: Your next dose is: Take 1 Tab by mouth daily. Take with food 1 Tab VALTREX 500 mg tablet Generic drug:  valACYclovir Your last dose was: Your next dose is: Take 500 mg by mouth two (2) times a day. 500 mg Where to Get Your Medications Information on where to get these meds will be given to you by the nurse or doctor. ! Ask your nurse or doctor about these medications  
  ibuprofen 800 mg tablet  
 oxyCODONE-acetaminophen 5-325 mg per tablet

## 2018-05-08 NOTE — H&P
Labor and Delivery Admission Note  5/8/2018    CC: Regular, painful contractios    21 y.o., , female, G1 P 0 @ 39 5/7 wks with Estimated Date of Delivery: 5/10/18 by dates and US presents at 1127 with report of irregular contractions since last night. They have become more intense and more regular this AM, every 5 minutes. She is feeling more pain in her back. She has active FM, no LOF, no VB, no discharge, no HSV outbreaks or prodromal symptoms. She is on Valtrex and has been taking it daily since 36 wks. Last outbreak @ 35 weeks. This pregnancy has been supervised by Dr. Kaylynn Aquino but patient has planned to deliver here @ [facility]. Reportedly was 2 cm last check in the office. PNC: Blood type: O            RH: pos            Ab screen: negative            Rubella: immune            RPR: nonreactive            1 hr GTT: 75            HIV: non reactive            Hgb electrophoresis: AA            GC/Chlamydia: negative            Urine culture: E. Coli (9/14/17)            SVII serology: positive history             GBS status: positive    Past Medical History:   Diagnosis Date    Back pain     Epilepsy ([address]     childhood    Fracture of finger of right hand 2014    Genital herpes     Herpes gestationis     outbreak at 35 weeks    Large breasts     Migraine headache     Pseudofolliculitis barbae 2/74/9257    Seizure ([address] 2008     Past Surgical History:   Procedure Laterality Date    HX ADENOIDECTOMY      HX OTHER SURGICAL      orthopedic (hand surgery)    HX OTHER SURGICAL      adenoids removed     OB/GYN: G1 current  Meds:   No current facility-administered medications for this encounter.       Allergies: No Known Allergies  Pertinent ROS: as per HPI o/w negative     Family History   Problem Relation Age of Onset   [facility] Migraines Mother     Hypertension Maternal Grandmother     Diabetes Maternal Grandmother     Hypertension Paternal Grandmother     Psychiatric Disorder Paternal Aunt      Social History     Social History    Marital status: SINGLE     Spouse name: N/A    Number of children: N/A    Years of education: N/A     Occupational History    Not on file. Social History Main Topics    Smoking status: Light Tobacco Smoker    Smokeless tobacco: Current User      Comment: lives with smoker grandma x 1 yr    Alcohol use No    Drug use: No    Sexual activity: No     Other Topics Concern    Not on file     Social History Narrative       OBJECTIVE:  Gravid , female NAD  Temp (24hrs), Av.1 °F (36.2 °C), Min:97.1 °F (36.2 °C), Max:97.1 °F (36.2 °C)    Visit Vitals    /88 (BP 1 Location: Left arm, BP Patient Position: Sitting)    Pulse 70    Temp 97.1 °F (36.2 °C)    Resp 14    Ht 5' 5\" (1.651 m)    Wt 81.2 kg (179 lb)    LMP 2017    Breastfeeding No    BMI 29.79 kg/m2       Labs:  No results found for: WBC    Exam:  HEENT:  normal   Lungs:  Normal work of breathing  Cor:  Well perfused  Abdomen:  Soft, gravid, EFW 7.5-8#, nontender  Fetal heart rate tracing:  Baseline 135, moderate variability, accels present, decels absent  Contraction pattern: q 5 minutes   Cervix:  2/80/BBOW/Vertex confirmed by US  Fluid:  Intact  : no lesions noted  Pelvimetry:  AP-good                      Arch- adequate                      Sidewalls- adequate                      Pelvis feels adequate for fetus. Impression:  IUP at 39 5/7 weeks with contractions/early labor. Plan: Patient will ambulate x 2 hours and return for repeat cervical exam           Continue Valtrex           PCN for GBS if patient is in labor    Polo Brock MD     Addendum:  Patient has ambulated for 2 hours and returned with continued contractions and also some light vaginal spotting. Cervix is rechecked and is now 2-3/90/BBOW. Will admit and initiate PCN for known GBS positive status. Patient encouraged to continue to ambulate.

## 2018-05-09 ENCOUNTER — ANESTHESIA EVENT (OUTPATIENT)
Dept: LABOR AND DELIVERY | Age: 18
End: 2018-05-09
Payer: COMMERCIAL

## 2018-05-09 ENCOUNTER — ANESTHESIA (OUTPATIENT)
Dept: LABOR AND DELIVERY | Age: 18
End: 2018-05-09
Payer: COMMERCIAL

## 2018-05-09 PROCEDURE — 77030011943

## 2018-05-09 PROCEDURE — 0HQ9XZZ REPAIR PERINEUM SKIN, EXTERNAL APPROACH: ICD-10-PCS | Performed by: OBSTETRICS & GYNECOLOGY

## 2018-05-09 PROCEDURE — 74011250636 HC RX REV CODE- 250/636: Performed by: OBSTETRICS & GYNECOLOGY

## 2018-05-09 PROCEDURE — 75410000000 HC DELIVERY VAGINAL/SINGLE

## 2018-05-09 PROCEDURE — 75410000002 HC LABOR FEE PER 1 HR

## 2018-05-09 PROCEDURE — 76060000078 HC EPIDURAL ANESTHESIA

## 2018-05-09 PROCEDURE — 77030007880 HC KT SPN EPDRL BBMI -B

## 2018-05-09 PROCEDURE — A4300 CATH IMPL VASC ACCESS PORTAL: HCPCS

## 2018-05-09 PROCEDURE — 74011000250 HC RX REV CODE- 250

## 2018-05-09 PROCEDURE — 3E0R3NZ INTRODUCTION OF ANALGESICS, HYPNOTICS, SEDATIVES INTO SPINAL CANAL, PERCUTANEOUS APPROACH: ICD-10-PCS | Performed by: ANESTHESIOLOGY

## 2018-05-09 PROCEDURE — 74011250637 HC RX REV CODE- 250/637: Performed by: OBSTETRICS & GYNECOLOGY

## 2018-05-09 PROCEDURE — 77030014125 HC TY EPDRL BBMI -B: Performed by: ANESTHESIOLOGY

## 2018-05-09 PROCEDURE — 65410000002 HC RM PRIVATE OB

## 2018-05-09 PROCEDURE — 75410000003 HC RECOV DEL/VAG/CSECN EA 0.5 HR

## 2018-05-09 PROCEDURE — 74011250636 HC RX REV CODE- 250/636: Performed by: ANESTHESIOLOGY

## 2018-05-09 PROCEDURE — 74011250636 HC RX REV CODE- 250/636

## 2018-05-09 RX ORDER — FENTANYL/BUPIVACAINE/NS/PF 2-1250MCG
1-16 PREFILLED PUMP RESERVOIR EPIDURAL CONTINUOUS
Status: DISCONTINUED | OUTPATIENT
Start: 2018-05-09 | End: 2018-05-11 | Stop reason: HOSPADM

## 2018-05-09 RX ORDER — SODIUM CHLORIDE 0.9 % (FLUSH) 0.9 %
5-10 SYRINGE (ML) INJECTION AS NEEDED
Status: DISCONTINUED | OUTPATIENT
Start: 2018-05-09 | End: 2018-05-11 | Stop reason: HOSPADM

## 2018-05-09 RX ORDER — SIMETHICONE 80 MG
80 TABLET,CHEWABLE ORAL
Status: DISCONTINUED | OUTPATIENT
Start: 2018-05-09 | End: 2018-05-11 | Stop reason: HOSPADM

## 2018-05-09 RX ORDER — NALOXONE HYDROCHLORIDE 0.4 MG/ML
0.4 INJECTION, SOLUTION INTRAMUSCULAR; INTRAVENOUS; SUBCUTANEOUS AS NEEDED
Status: DISCONTINUED | OUTPATIENT
Start: 2018-05-09 | End: 2018-05-09 | Stop reason: HOSPADM

## 2018-05-09 RX ORDER — EPHEDRINE SULFATE 50 MG/ML
10 INJECTION, SOLUTION INTRAVENOUS
Status: DISCONTINUED | OUTPATIENT
Start: 2018-05-09 | End: 2018-05-09 | Stop reason: HOSPADM

## 2018-05-09 RX ORDER — MINERAL OIL
OIL (ML) ORAL
Status: DISPENSED
Start: 2018-05-09 | End: 2018-05-10

## 2018-05-09 RX ORDER — LIDOCAINE HYDROCHLORIDE AND EPINEPHRINE 15; 5 MG/ML; UG/ML
INJECTION, SOLUTION EPIDURAL AS NEEDED
Status: DISCONTINUED | OUTPATIENT
Start: 2018-05-09 | End: 2018-05-10 | Stop reason: HOSPADM

## 2018-05-09 RX ORDER — BUPIVACAINE HYDROCHLORIDE 5 MG/ML
30 INJECTION, SOLUTION EPIDURAL; INTRACAUDAL AS NEEDED
Status: DISCONTINUED | OUTPATIENT
Start: 2018-05-09 | End: 2018-05-09 | Stop reason: HOSPADM

## 2018-05-09 RX ORDER — IBUPROFEN 400 MG/1
800 TABLET ORAL EVERY 8 HOURS
Status: DISCONTINUED | OUTPATIENT
Start: 2018-05-09 | End: 2018-05-11 | Stop reason: HOSPADM

## 2018-05-09 RX ORDER — FENTANYL CITRATE 50 UG/ML
INJECTION, SOLUTION INTRAMUSCULAR; INTRAVENOUS AS NEEDED
Status: DISCONTINUED | OUTPATIENT
Start: 2018-05-09 | End: 2018-05-10 | Stop reason: HOSPADM

## 2018-05-09 RX ORDER — SODIUM CHLORIDE 0.9 % (FLUSH) 0.9 %
5-10 SYRINGE (ML) INJECTION EVERY 8 HOURS
Status: DISCONTINUED | OUTPATIENT
Start: 2018-05-09 | End: 2018-05-11 | Stop reason: HOSPADM

## 2018-05-09 RX ORDER — DIPHENHYDRAMINE HCL 25 MG
25 CAPSULE ORAL
Status: DISCONTINUED | OUTPATIENT
Start: 2018-05-09 | End: 2018-05-11 | Stop reason: HOSPADM

## 2018-05-09 RX ORDER — OXYTOCIN/RINGER'S LACTATE 20/1000 ML
125-1000 PLASTIC BAG, INJECTION (ML) INTRAVENOUS AS NEEDED
Status: DISCONTINUED | OUTPATIENT
Start: 2018-05-09 | End: 2018-05-11 | Stop reason: HOSPADM

## 2018-05-09 RX ORDER — BUPIVACAINE HYDROCHLORIDE 5 MG/ML
INJECTION, SOLUTION EPIDURAL; INTRACAUDAL AS NEEDED
Status: DISCONTINUED | OUTPATIENT
Start: 2018-05-09 | End: 2018-05-10 | Stop reason: HOSPADM

## 2018-05-09 RX ORDER — DOCUSATE SODIUM 100 MG/1
100 CAPSULE, LIQUID FILLED ORAL
Status: DISCONTINUED | OUTPATIENT
Start: 2018-05-09 | End: 2018-05-11 | Stop reason: HOSPADM

## 2018-05-09 RX ORDER — OXYTOCIN IN 5 % DEXTROSE 30/500 ML
PLASTIC BAG, INJECTION (ML) INTRAVENOUS
Status: COMPLETED
Start: 2018-05-09 | End: 2018-05-09

## 2018-05-09 RX ORDER — AMMONIA 15 % (W/V)
1 AMPUL (EA) INHALATION AS NEEDED
Status: DISCONTINUED | OUTPATIENT
Start: 2018-05-09 | End: 2018-05-11 | Stop reason: HOSPADM

## 2018-05-09 RX ORDER — HYDROCORTISONE ACETATE PRAMOXINE HCL 2.5; 1 G/100G; G/100G
CREAM TOPICAL AS NEEDED
Status: DISCONTINUED | OUTPATIENT
Start: 2018-05-09 | End: 2018-05-11 | Stop reason: HOSPADM

## 2018-05-09 RX ORDER — OXYTOCIN IN 5 % DEXTROSE 30/500 ML
1-25 PLASTIC BAG, INJECTION (ML) INTRAVENOUS
Status: DISCONTINUED | OUTPATIENT
Start: 2018-05-09 | End: 2018-05-11 | Stop reason: HOSPADM

## 2018-05-09 RX ORDER — ONDANSETRON 4 MG/1
4 TABLET, ORALLY DISINTEGRATING ORAL
Status: DISCONTINUED | OUTPATIENT
Start: 2018-05-09 | End: 2018-05-11 | Stop reason: HOSPADM

## 2018-05-09 RX ORDER — FENTANYL CITRATE 50 UG/ML
100 INJECTION, SOLUTION INTRAMUSCULAR; INTRAVENOUS ONCE
Status: DISCONTINUED | OUTPATIENT
Start: 2018-05-09 | End: 2018-05-09 | Stop reason: HOSPADM

## 2018-05-09 RX ORDER — VALACYCLOVIR HYDROCHLORIDE 500 MG/1
500 TABLET, FILM COATED ORAL DAILY
Status: DISCONTINUED | OUTPATIENT
Start: 2018-05-09 | End: 2018-05-11 | Stop reason: HOSPADM

## 2018-05-09 RX ORDER — OXYCODONE AND ACETAMINOPHEN 5; 325 MG/1; MG/1
2 TABLET ORAL
Status: DISCONTINUED | OUTPATIENT
Start: 2018-05-09 | End: 2018-05-11 | Stop reason: HOSPADM

## 2018-05-09 RX ORDER — HYDROCORTISONE 1 %
CREAM (GRAM) TOPICAL AS NEEDED
Status: DISCONTINUED | OUTPATIENT
Start: 2018-05-09 | End: 2018-05-11 | Stop reason: HOSPADM

## 2018-05-09 RX ADMIN — Medication 10 ML/HR: at 09:44

## 2018-05-09 RX ADMIN — IBUPROFEN 800 MG: 400 TABLET ORAL at 16:38

## 2018-05-09 RX ADMIN — VALACYCLOVIR HYDROCHLORIDE 500 MG: 500 TABLET, FILM COATED ORAL at 09:13

## 2018-05-09 RX ADMIN — OXYCODONE HYDROCHLORIDE AND ACETAMINOPHEN 1 TABLET: 5; 325 TABLET ORAL at 20:52

## 2018-05-09 RX ADMIN — NALBUPHINE HYDROCHLORIDE 10 MG: 10 INJECTION, SOLUTION INTRAMUSCULAR; INTRAVENOUS; SUBCUTANEOUS at 05:59

## 2018-05-09 RX ADMIN — Medication 3 MILLI-UNITS/MIN: at 05:50

## 2018-05-09 RX ADMIN — Medication 6 MILLI-UNITS/MIN: at 07:00

## 2018-05-09 RX ADMIN — BUPIVACAINE HYDROCHLORIDE 2 ML: 5 INJECTION, SOLUTION EPIDURAL; INTRACAUDAL at 09:39

## 2018-05-09 RX ADMIN — FENTANYL CITRATE 100 MCG: 50 INJECTION, SOLUTION INTRAMUSCULAR; INTRAVENOUS at 09:39

## 2018-05-09 RX ADMIN — PENICILLIN G POTASSIUM 2.5 MILLION UNITS: 20000000 POWDER, FOR SOLUTION INTRAVENOUS at 11:55

## 2018-05-09 RX ADMIN — SODIUM CHLORIDE, SODIUM LACTATE, POTASSIUM CHLORIDE, AND CALCIUM CHLORIDE 500 ML: 600; 310; 30; 20 INJECTION, SOLUTION INTRAVENOUS at 13:30

## 2018-05-09 RX ADMIN — PENICILLIN G POTASSIUM 2.5 MILLION UNITS: 20000000 POWDER, FOR SOLUTION INTRAVENOUS at 08:19

## 2018-05-09 RX ADMIN — PENICILLIN G POTASSIUM 2.5 MILLION UNITS: 20000000 POWDER, FOR SOLUTION INTRAVENOUS at 03:39

## 2018-05-09 RX ADMIN — Medication 2 MILLI-UNITS/MIN: at 05:10

## 2018-05-09 RX ADMIN — NALBUPHINE HYDROCHLORIDE 10 MG: 10 INJECTION, SOLUTION INTRAMUSCULAR; INTRAVENOUS; SUBCUTANEOUS at 08:14

## 2018-05-09 RX ADMIN — Medication 4 MILLI-UNITS/MIN: at 06:34

## 2018-05-09 RX ADMIN — ALUMINUM HYDROXIDE AND MAGNESIUM HYDROXIDE 30 ML: 200; 200 SUSPENSION ORAL at 18:19

## 2018-05-09 RX ADMIN — LIDOCAINE HYDROCHLORIDE AND EPINEPHRINE 5 ML: 15; 5 INJECTION, SOLUTION EPIDURAL at 09:39

## 2018-05-09 NOTE — L&D DELIVERY NOTE
This patient was 30 or more weeks gestation at the time of [facility]. For complete information pertaining to this patient's pregnancy, please refer to the paper chart and ACOG form. Delivery Note    Obstetrician:  Dior Myles MD    Assistant: none    Pre-Delivery Diagnosis: Term pregnancy    Post-Delivery Diagnosis: Living  infant(s)    Intrapartum Event: None    Procedure: Spontaneous vaginal delivery    Epidural: YES    Monitor:  Fetal Heart Tones - External and Uterine Contractions - External    Indications for instrumental delivery: none    Estimated Blood Loss: 300    Episiotomy: none    Laceration(s):  1st degree    Laceration(s) repair: YES    Presentation: Cephalic    Fetal Description: rg    Fetal Position: Occiput Anterior    Birth Weight: pending    Birth Length: pending    Apgar - One Minute: 7    Apgar - Five Minutes: 9    Umbilical Cord: 3 vessels present    Specimens: none           Complications:  Shoulder dystocia relieved with aBrbi and suprapubic pressure in less than 1 minute            Cord Blood Results:   Information for the patient's :  Jackie Goode [[id number]]   No results found for: PCTABR, ABORH, PCTDIG, BILI, ABORH, ABORHEXT    Prenatal Labs:     Lab Results   Component Value Date/Time    ABO,Rh O Positive 2017    HBsAg, External Non-Reactive 2017    HIV, External Non-Reactive 2017    Rubella, External Immune 2017    RPR, External Non-Reactive 2017    GrBStrep, External Positive 2018        Attending Attestation: I performed the procedure    Signed By:  Dior Myles MD     May 9, 2018

## 2018-05-09 NOTE — ANESTHESIA PREPROCEDURE EVALUATION
Anesthetic History   No history of anesthetic complications            Review of Systems / Medical History  Patient summary reviewed, nursing notes reviewed and pertinent labs reviewed    Pulmonary  Within defined limits                 Neuro/Psych   Within defined limits  seizures         Cardiovascular  Within defined limits                     GI/Hepatic/Renal  Within defined limits              Endo/Other  Within defined limits           Other Findings              Physical Exam    Airway  Mallampati: II  TM Distance: > 6 cm  Neck ROM: normal range of motion   Mouth opening: Normal     Cardiovascular  Regular rate and rhythm,  S1 and S2 normal,  no murmur, click, rub, or gallop             Dental  No notable dental hx       Pulmonary  Breath sounds clear to auscultation               Abdominal  GI exam deferred       Other Findings            Anesthetic Plan    ASA: 2  Anesthesia type: epidural          Induction: Intravenous  Anesthetic plan and risks discussed with: Patient

## 2018-05-09 NOTE — ROUTINE PROCESS
1930~  Report and transfer of care from [place] [address] and [address] [place] [address]. 2100~  Dr Person Second in to see patient  2105~  SVE. Plan of care discussed with patient. Encouraged to walk and change position frequently. 0004~  Suggested patient change positions, due to continued back pain with contractions. Pt. Refused  0015~  Patient up to bathroom  0127~  Patient up to bathroom. Continued use of Nitrous discussed, thu states she isn't having any pain and would like to sleep. Patient off monitor.   0338~  Resume monitoring  0342~  Patient up to bathroom

## 2018-05-09 NOTE — PROGRESS NOTES
8276: Patient requesting epidural. Starting fluid bolus now. 2116: Dr. Everett Cook at bedside to place epidural for patient. 1055: Dr. Kennedy Moulton at bedside to see patient. SVE 9/100/0, FSE placed. AROM with clear fluid.

## 2018-05-09 NOTE — PROGRESS NOTES
Labor Progress Note  Patient seen, fetal heart rate and contraction pattern evaluated.      Physical Exam:  Cervical Exam: C/C/+1  Membranes:  Ruptured  Uterine Activity: Frequency: regular  Fetal Heart Rate: 120's  Accelerations: No  Decelerations: variable with pushing to 100's  Variability: minimal  Uterine contractions: regular    Assessment/Plan:  CAt 2 NST with Pushing- close observation  2nd stage labor with good progress(descent after full dilation)  Anticipate     Elizabeth Appiah MD

## 2018-05-09 NOTE — ANESTHESIA PROCEDURE NOTES
Epidural Block    Start time: 5/9/2018 9:32 AM  End time: 5/9/2018 9:42 AM  Performed by: TREV Lin  Authorized by: TREV Lin     Pre-Procedure  Indication: labor epidural    Preanesthetic Checklist: patient identified, risks and benefits discussed, anesthesia consent, site marked, patient being monitored, timeout performed and anesthesia consent    Timeout Time: 09:32        Epidural:   Patient position:  Left lateral decubitus  Prep region:  Lumbar  Prep: Betadine    Location:  L2-3    Needle and Epidural Catheter:   Needle Type:  Tuohy  Needle Gauge:  17 G  Injection Technique:  Loss of resistance using air  Attempts:  1  Catheter Size:  19 G  Events: no blood with aspiration, no cerebrospinal fluid with aspiration, no paresthesia and negative aspiration test    Test Dose:  Negative and lidocaine 1.5% w/ epi    Assessment:   Catheter Secured:  Tegaderm and tape  Insertion:  Uncomplicated  Patient tolerance:  Patient tolerated the procedure well with no immediate complications

## 2018-05-09 NOTE — PROGRESS NOTES
Labor Progress Note  Patient seen, fetal heart rate and contraction pattern evaluated.      Physical Exam:  Cervical Exam: not examined (fetal head seen at introitus with pushing)  Membranes:  Ruptured  Uterine Activity: Frequency: regular  Fetal Heart Rate: 120  Accelerations: no  Decelerations: variable to 100's with pushing  Variability: Minimal  Uterine contractions: regular    Assessment/Plan:  2nd stage labor with adequate progress after 1 hour of pushing  Cat 2 NSt- will push with every other contraction, give IVF bolus 500cc and continue o2 and cut pitocin down to 4 miu/min down from 8 miu/min    Gurinder Hurt MD

## 2018-05-09 NOTE — PROGRESS NOTES
OB HOSPITALIST    Patient is now getting uncomfortable with pitocin. Has had nubain and now states she feels like it is wearing off. Now changing her mind about no epidural but isn't quite ready for it yet.       FHTs baseline 120, moderate variability, no accels, no decels  TOCO q 2-3 minutes  Pit @ 6  Cx 4-5/90/-2/BBOW    Continue to titrate pitocin  Continue PCN for GBS  Encouraged OOB and use of birthing ball  May consider epidural after an additional dose of nubain

## 2018-05-09 NOTE — PROGRESS NOTES
Labor Progress Note  Assumed care of Patient from Dr Avel Garibay @ 3509 Am Patient seen, fetal heart rate and contraction pattern evaluated. Physical Exam:  Cervical Exam: 9/C/0  Membranes:  AROM Clear  Uterine Activity: Frequency: regular  Fetal Heart Rate: 120 BPM  Accelerations: no  Decelerations: variable  Variability;  Minimal  Uterine contractions: regular    Assessment/Plan:  Cat 2 NST   9cm/C/0  AROm clear-FSL placed  Brooks Tripp MD

## 2018-05-09 NOTE — PROGRESS NOTES
0730: Bedside, Verbal and Written shift change report given to ROLANDO Tyson (oncoming nurse) by Kim Adhikari RN (offgoing nurse). Report included the following information SBAR, MAR and Recent Results. 1524: Notified Dr. Quang Sena of elevated blood pressures. Continue monitoring pressure per protocol at this time. 1650: TRANSFER - OUT REPORT:    Verbal report given to ARLEEN Davis RN(name) on [facility]  being transferred to mother infant unit (unit) for routine progression of care       Report consisted of patients Situation, Background, Assessment and   Recommendations(SBAR). Information from the following report(s) SBAR was reviewed with the receiving nurse. Lines:   Peripheral IV 05/08/18 Left Wrist (Active)        Opportunity for questions and clarification was provided.       Patient transported with:   Registered Nurse

## 2018-05-09 NOTE — PROGRESS NOTES
OB HOSPITALIST    Patient is doing well, staying mobile with ambulation and using the birthing ball.   Still with some spotting but less than after the 1st exam.    Intermittent fetal monitoring with CAT I tracing  TOCO q 5-6 min  Cx 4/90/BBOW    Continue expectant management, early labor  Continue PCN for GBS

## 2018-05-10 PROCEDURE — 65410000002 HC RM PRIVATE OB

## 2018-05-10 PROCEDURE — 74011250637 HC RX REV CODE- 250/637: Performed by: OBSTETRICS & GYNECOLOGY

## 2018-05-10 RX ADMIN — IBUPROFEN 800 MG: 400 TABLET ORAL at 17:11

## 2018-05-10 RX ADMIN — OXYCODONE HYDROCHLORIDE AND ACETAMINOPHEN 2 TABLET: 5; 325 TABLET ORAL at 11:42

## 2018-05-10 RX ADMIN — VALACYCLOVIR HYDROCHLORIDE 500 MG: 500 TABLET, FILM COATED ORAL at 11:44

## 2018-05-10 RX ADMIN — ALUMINUM HYDROXIDE AND MAGNESIUM HYDROXIDE 30 ML: 200; 200 SUSPENSION ORAL at 22:19

## 2018-05-10 RX ADMIN — IBUPROFEN 800 MG: 400 TABLET ORAL at 01:04

## 2018-05-10 RX ADMIN — IBUPROFEN 800 MG: 400 TABLET ORAL at 08:49

## 2018-05-10 NOTE — ROUTINE PROCESS
Bedside and Verbal shift change report given to ASUNCION Palmer RN (oncoming nurse) by ARLEEN Milan RN (offgoing nurse). Report included the following information SBAR, Kardex, MAR and Accordion.

## 2018-05-10 NOTE — PROGRESS NOTES
Patient called out with complaints of a feeling of tightness in her chest and said she felt like she could not get a good breath. Patient stated that it had been happening on and off all day. No complaints of headache, chest pain or visual disturbances. She also states it improves when ambulating. Pulse Ox 100% on room air /94. Patient states she has only slept 2 hours in the last 2 days. No prior history of asthma. Instructed patient to call out if symptoms persist or worsen. Will continue to monitor.

## 2018-05-10 NOTE — PROGRESS NOTES
PPD#1 s/p      Reports feeling some generalized soreness and fatigue; has been tolerating regular diet. Reports lochia similar to heavy menses. She has been walking to the bathroom and voiding without difficulty. Bloutod pressures noted to have been elevated around the time of delivery but normal this morning. She denies headache, visual changes, or increased swelling. Visit Vitals    /78 (BP 1 Location: Left arm, BP Patient Position: At rest)    Pulse 61    Temp 97.7 °F (36.5 °C)    Resp 14    Ht 5' 5\" (1.651 m)    Wt 81.2 kg (179 lb)    LMP 2017    SpO2 99%    Breastfeeding No    BMI 29.79 kg/m2        General - alert, no acute distress  Abdomen - FF, NT below umbilicus  Extremities - non-tender, no edema     Impression- PPD #1 S/P , recovering normally.     Plan - Continue postpartum care per routine; encouraged ambulation. Will continue to monitor BPs. O+/rubella immune.     Piter Sidhu MD  8:16 AM

## 2018-05-10 NOTE — ROUTINE PROCESS
Bedside shift change report given to Deana Momin RN (oncoming nurse) by Chloé Beckwith RN (offgoing nurse).  Report included the following information SBAR

## 2018-05-11 VITALS
HEART RATE: 56 BPM | DIASTOLIC BLOOD PRESSURE: 82 MMHG | OXYGEN SATURATION: 100 % | HEIGHT: 65 IN | RESPIRATION RATE: 16 BRPM | TEMPERATURE: 98.6 F | BODY MASS INDEX: 29.82 KG/M2 | SYSTOLIC BLOOD PRESSURE: 129 MMHG | WEIGHT: 179 LBS

## 2018-05-11 PROBLEM — R52 PAIN: Status: ACTIVE | Noted: 2018-05-11

## 2018-05-11 PROCEDURE — 74011250637 HC RX REV CODE- 250/637: Performed by: OBSTETRICS & GYNECOLOGY

## 2018-05-11 RX ORDER — IBUPROFEN 800 MG/1
800 TABLET ORAL EVERY 8 HOURS
Qty: 30 TAB | Refills: 0 | Status: SHIPPED | OUTPATIENT
Start: 2018-05-11

## 2018-05-11 RX ORDER — OXYCODONE AND ACETAMINOPHEN 5; 325 MG/1; MG/1
1 TABLET ORAL
Qty: 12 TAB | Refills: 0 | Status: SHIPPED | OUTPATIENT
Start: 2018-05-11

## 2018-05-11 RX ADMIN — IBUPROFEN 800 MG: 400 TABLET ORAL at 12:11

## 2018-05-11 RX ADMIN — IBUPROFEN 800 MG: 400 TABLET ORAL at 04:14

## 2018-05-11 RX ADMIN — VALACYCLOVIR HYDROCHLORIDE 500 MG: 500 TABLET, FILM COATED ORAL at 11:38

## 2018-05-11 NOTE — PROGRESS NOTES
2945 - Referral noted and I have met with Mom. CM contacted [employer] [facility] ([address] [facility]) with permission from mom to provide early childhood home visitation services. [facility] works in partnership with pregnant women and families with young children to provide health education, parenting guidance and support. Application form faxed to [phone]. Referral phone# [phone]. [email or web address] Update to [facility] Nurse - Alexus Sofia RN. Mom provided contact info of Ruben - [phone]. [facility] [address] [place] [address], [address] [facility]. org [phone]  ([phone]. Mom knows she will be required to coordinate pickup of all items, initially a weeks worth of diapers will be provided. Mom understands that she is required to sign up for, attend a parenting class and bring the baby to the class to receive the remaining diapers for her baby. Mom knows that they will be notified via email when the order is ready for pickup. The order will not be ready prior to the receipt of the e-mail. Once e-mail is received, the order can be picked up between the hours of 10:00a-2:00p on  and . All orders must be picked up within 5-7 business days of a new referral must be submitted. This CM spoke with mom extensively on the importance of follow up appts for herself and her  daughter. Mom appreciates and verbalizes the importance to reach out to her healthcare team if needed. Mom has food stamps and is already enrolled in [facility] and states she will call [facility] and let them know she had the baby. Mom stated resources will help and appreciates the assistance. CM will continue to follow. Karen Fink, MSN, [facility], RN, [facility] [address] - [phone].     Care Management Note: Psychosocial Assessment/support  (PICU/PEDS/NICU)    Reason for Referral/Presenting Problem: Needs assessment being done on this 21y.o. year old patient. Patients chart reviewed and history noted. CM met with patient to introduce role and offer freedom of choice. No preference indicated. Informants: CM met with patient and she responded to this workers questions, asking questions appropriately and answering questions in the same. Mom is a domestic  and patients boyfriend (father of baby) works at [address] [place] [address] as a . They all live with her maternal grandparents. Patient stated she knows having a baby will be a huge change but she can rely on her grandparents to help and her mom lives 10-15 minutes away in [address] [place] [address]. She feels she has good family support. Current Social History:  Anastasiia Cody is a 21 y.o.  AA or black admitted to [address] [place] [address] for delivery of Baby Girl. She resides in [facility] with the father of the baby and her maternal grandparents. Recent Losses:  Shakira Stafford)    Psychiatric HistorySuicidal/Homicidal Ideation: Shakira Stafford)     Significant Medical Information: See chart notes    Substance Abuse History/Current Pattern of Use:  (UNK)    Legal or [facility] Concerns (CPS referral, Court paperwork etc.) : Shakira Stafford)     Positive Support Systems: Patient reports adequate social support system. Work/Educational History: N/A    Specialist (re: Pulmonologist):  Shakira Stafford)    DME/Nursing preference:  Shakira Stafford)    What type of transportation will be used upon discharge? Father of Baby     Financial Situation/Resources: [employer] [address]      Preliminary Discharge Plan/Identified; Bedside assessment completed. Demographic and Primary Care Provider (PCP) verified and correct. CM will continue to follow discharge planning needs for continuum of care.   Joneen Aase, RN, CRM

## 2018-05-11 NOTE — PROGRESS NOTES
Post Partum Day #2-     Doing welll, voiding without difficulty and good pain control. VSS/AF    Abd- FF, NT below umbilicus  Breasts- NE  Perineum- decreasing lochia, intact  Extrem- negative for edema or Camryn's    Impression- PPD #2 S/P     Plan- Discharge/ Medications written and reviewed/  Patient will call and make PP appt in 2  weeks. Pt will call if she has any issues or concerns in the meantime.      Marlo Blackwell MD

## 2018-05-11 NOTE — DISCHARGE INSTRUCTIONS
POSTPARTUM DISCHARGE INSTRUCTIONS       Name:  Zina Pinto  YOB: 2000  Admission Diagnosis:  Labor, prolonged latent phase     Discharge Diagnosis:    Problem List as of 5/11/2018  Date Reviewed: 5/8/2018          Codes Class Noted - Resolved    Pain ICD-10-CM: R52  ICD-9-CM: 780.96  5/11/2018 - Present    Overview Signed 5/11/2018  9:46 AM by Ronna Medellin MD     Perineal laceration              * (Principal)Uterine contractions during pregnancy ICD-10-CM: O62.2  ICD-9-CM: 661.20  5/8/2018 - Present        False labor after 37 weeks of gestation without delivery ICD-10-CM: O47.1  ICD-9-CM: 644.10  5/2/2018 - Present        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  ICD-9-CM: 704.8  3/29/2015 - Present        Migraine headache ICD-10-CM: P49.400  ICD-9-CM: 346.90  Unknown - Present        Dermatitis ICD-10-CM: L30.9  ICD-9-CM: 692.9  6/28/2011 - Present        Pain, upper back ICD-10-CM: M54.9  ICD-9-CM: 724.5  6/28/2011 - Present    Overview Signed 6/28/2011  4:03 PM by DO cally Hinson due to strain from breasts             DUB (dysfunctional uterine bleeding) ICD-10-CM: N93.8  ICD-9-CM: 626.8  6/28/2011 - Present    Overview Signed 6/28/2011  4:04 PM by Riaz Ledezma DO     possibly due to hormonal imbalance                 Attending Physician:  Jeffrey Norris MD    Delivery Type:  Vaginal Childbirth with Episiotomy, Laceration or Tear: What To Expect At [address] [place] [address] will slowly heal in the next few weeks. It is easy to get too tired and overwhelmed during the first weeks after your baby is born. Changes in your hormones can shift your mood without warning. You may find it hard to meet the extra demands on your energy and time. Take it easy on yourself. Follow-up care is a key part of your treatment and safety. Be sure to make and go to all appointments, and call your doctor if you are having problems.  It's also a good idea to know your test results and keep a list of the medicines you take. How can you care for yourself at home? Vaginal Bleeding and Cramps  · After delivery, you will have a bloody discharge from the vagina. This will turn pink within a week and then white or yellow after about 10 days. It may last for 2 to 4 weeks or longer, until the uterus has healed. Use pads instead of tampons until you stop bleeding. · Do not worry if you pass some blood clots, as long as they are smaller than a golf ball. If you have a tear or stitches in your vaginal area, change the pad at least every 4 hours to prevent soreness and infection. · You may have cramps for the first few days after childbirth. These are normal and occur as the uterus shrinks to normal size. Take an over-the-counter pain medicine, such as acetaminophen (Tylenol), ibuprofen (Advil, Motrin), or naproxen (Aleve), for cramps. Read and follow all instructions on the label. Do not take aspirin, because it can cause more bleeding. Do not take acetaminophen (Tylenol) and other acetaminophen containing medications (i.e. Percocet) at the same time. Episiotomy, Lacerations or Tears  · If you have stitches, they will dissolve on their own and do not need to be removed. · Put ice or a cold pack on your painful area for 10 to 20 minutes at a time, several times a day, for the first few days. Put a thin cloth between the ice and your skin. · Sit in a few inches of warm water (sitz bath) 3 times a day and after bowel movements. The warm water helps with pain and itching. If you do not have a tub, a warm shower might help. Breast fullness  · Your breasts may overfill (engorge) in the first few days after delivery. To help milk flow and to relieve pain, warm your breasts in the shower or by using warm, moist towels before nursing. · If you are not nursing, do not put warmth on your breasts or touch your breasts.  Wear a tight bra or sports bra and use ice until the fullness goes away. This usually takes 2 to 3 days. · Put ice or a cold pack on your breast after nursing to reduce swelling and pain. Put a thin cloth between the ice and your skin. Activity  · Eat a balanced diet. Do not try to lose weight by cutting calories. Keep taking your prenatal vitamins, or take a multivitamin. · Get as much rest as you can. Try to take naps when your baby sleeps during the day. · Get some exercise every day. But do not do any heavy exercise until your doctor says it is okay. · Wait until you are healed (about 4 to 6 weeks) before you have sexual intercourse. Your doctor will tell you when it is okay to have sex. · Talk to your doctor about birth control. You can get pregnant even before your period returns. Also, you can get pregnant while you are breast-feeding. Mental Health  · Many women get the \"baby blues\" during the first few days after childbirth. You may lose sleep, feel irritable, and cry easily. You may feel happy one minute and sad the next. Hormone changes are one cause of these emotional changes. Also, the demands of a new baby, along with visits from relatives or other family needs, add to a mother's stress. The \"baby blues\" often peak around the fourth day. Then they ease up in less than 2 weeks. · If your moodiness or anxiety lasts for more than 2 weeks, or if you feel like life is not worth living, you may have postpartum depression. This is different for each mother. Some mothers with serious depression may worry intensely about their infant's well-being. Others may feel distant from their child. Some mothers might even feel that they might harm their baby. A mother may have signs of paranoia, wondering if someone is watching her. · With all the changes in your life, you may not know if you are depressed. Pregnancy sometimes causes changes in how you feel that are similar to the symptoms of depression.   · Symptoms of depression include:  · Feeling sad or hopeless and losing interest in daily activities. These are the most common symptoms of depression. · Sleeping too much or not enough. · Feeling tired. You may feel as if you have no energy. · Eating too much or too little. · POST[employer]UM [employer] (PSI) offers a Warm line; Chat with the Expert phone sessions; Information and Articles about Pregnancy and Postpartum Mood Disorders; Comprehensive List of Free Support Groups; Knowledgeable local coordinators who will offer support, information, and resources; Guide to Resources on Jibestream; Calendar of events in the  mood disorders community; Latest News and Research; and [facility] [address] for [employer]. Remember - You are not alone; You are not to blame; With help, you will be well. 5-144-259-PPD(5158). WWW. POSTPARTUM. NET    · Writing or talking about death, such as writing suicide notes or talking about guns, knives, or pills. Keep the numbers for these national suicide hotlines: 8-814-739-TALK (7-848.254.1377) and 6-108-APUJVPY (9-326.427.4051). If you or someone you know talks about suicide or feeling hopeless, get help right away. Constipation and Hemorrhoids  Drink plenty of fluids, enough so that your urine is light yellow or clear like water. If you have kidney, heart, or liver disease and have to limit fluids, talk with your doctor before you increase the amount of fluids you drink. · Eat plenty of fiber each day. Have a bran muffin or bran cereal for breakfast, and try eating a piece of fruit for a mid-afternoon snack. · For painful, itchy hemorrhoids, put ice or a cold pack on the area several times a day for 10 minutes at a time. Follow this by putting a warm compress on the area for another 10 to 20 minutes or by sitting in a shallow, warm bath. When should you call for help? Call 911 anytime you think you may need emergency care.  For example, call if:  · You are thinking of hurting yourself, your baby, or anyone else.  · You passed out (lost consciousness). · You have symptoms of a blood clot in your lung (called a pulmonary embolism). These may include:  · Sudden chest pain. · Trouble breathing. · Coughing up blood. Call your doctor now or seek immediate medical care if:  · You have severe vaginal bleeding. · You are soaking through a pad each hour for 2 or more hours. · Your vaginal bleeding seems to be getting heavier or is still bright red 4 days after delivery. · You are dizzy or lightheaded, or you feel like you may faint. · You are vomiting or cannot keep fluids down. · You have a fever. · You have new or more belly pain. · You pass tissue (not just blood). · Your vaginal discharge smells bad. · Your belly feels tender or full and hard. · Your breasts are continuously painful or red. · You feel sad, anxious, or hopeless for more than a few days. · You have sudden, severe pain in your belly. · You have symptoms of a blood clot in your leg (called a deep vein thrombosis), such as:  · Pain in your calf, back of the knee, thigh, or groin. · Redness and swelling in your leg or groin. · You have symptoms of preeclampsia, such as:  · Sudden swelling of your face, hands, or feet. · New vision problems (such as dimness or blurring). · A severe headache. · Your blood pressure is higher than it should be or rises suddenly. · You have new nausea or vomiting. Watch closely for changes in your health, and be sure to contact your doctor if you have any problems. Additional Information:  Postpartum Support    PARENTS:  Are you feeling sad or depressed? Is it difficult for you to enjoy yourself? Do you feel more irritable or tense? Do you feel anxious or panicky? Are you having difficulty bonding with your baby? Do you feel as if you are \"out of control\" or \"going crazy\"? Are you worried that you might hurt your baby or yourself?       FAMILIES: Do you worry that something is wrong but don't know how to help? Do you think that your partner or spouse is having problems coping? Are you worried that it may never get better? While many women experience some mild mood change or \"the blues\" during or after the birth of a child, 1 in 9 women experience more significant symptoms of depression or anxiety. 1 in 10 Dads become depressed during the first year. Things you can do  Being a good parent includes taking care of yourself. If you take care of yourself, you will be able to take better care of your baby and your family. · Talk to a counselor or healthcare provider who has training in  mood and anxiety problems. · Learn as much as you can about pregnancy and postpartum depression and anxiety. · Get support from family and friends. Ask for help when you need it. · Join a support group in your area or online. · Keep active by walking, stretching or whatever form of exercise helps you to feel better. · Get enough rest and time for yourself. · Eat a healthy diet. · Don't give up! It may take more than one try to get the right help you need. These are general instructions for a healthy lifestyle:    No smoking/ No tobacco products/ Avoid exposure to second hand smoke    Surgeon General's Warning:  Quitting smoking now greatly reduces serious risk to your health. Obesity, smoking, and sedentary lifestyle greatly increases your risk for illness    A healthy diet, regular physical exercise & weight monitoring are important for maintaining a healthy lifestyle    Recognize signs and symptoms of STROKE:    F-face looks uneven    A-arms unable to move or move unevenly    S-speech slurred or non-existent    T-time-call 911 as soon as signs and symptoms begin - DO NOT go       back to bed or wait to see if you get better - TIME IS BRAIN. I have had the opportunity to make my options or choices for discharge. I have received and understand these instructions.

## 2018-05-11 NOTE — ROUTINE PROCESS
Bedside and Verbal shift change report given to ASUNCION Robles RN (oncoming nurse) by Kevin Avalos RN (offgoing nurse). Report included the following information SBAR, Kardex, MAR and Accordion.

## 2018-05-11 NOTE — ROUTINE PROCESS
Bedside SBAR received from Nataliya Messina RN. I have reviewed discharge instructions with the patient. The patient verbalized understanding.

## 2019-01-12 ENCOUNTER — ED HISTORICAL/CONVERTED ENCOUNTER (OUTPATIENT)
Dept: OTHER | Age: 19
End: 2019-01-12

## 2019-06-07 ENCOUNTER — ED HISTORICAL/CONVERTED ENCOUNTER (OUTPATIENT)
Dept: OTHER | Age: 19
End: 2019-06-07

## 2020-09-29 ENCOUNTER — NURSE TRIAGE (OUTPATIENT)
Dept: OTHER | Facility: CLINIC | Age: 20
End: 2020-09-29

## 2020-09-29 NOTE — TELEPHONE ENCOUNTER
Cough and head cold for the past couple of days. Pt would like to see a provider tomorrow. Reason for Disposition   Patient wants to be seen    Answer Assessment - Initial Assessment Questions  1. ONSET: \"When did the cough begin? \"       Couple days ago    2. SEVERITY: \"How bad is the cough today? \"       Spells. Does not keep her awake at night. Dry.    3. RESPIRATORY DISTRESS: \"Describe your breathing. \"       Denies    4. FEVER: \"Do you have a fever? \" If so, ask: \"What is your temperature, how was it measured, and when did it start? \"      Denies    5. HEMOPTYSIS: \"Are you coughing up any blood? \" If so ask: \"How much? \" (flecks, streaks, tablespoons, etc.)      Denies    6. TREATMENT: \"What have you done so far to treat the cough? \" (e.g., meds, fluids, humidifier)      Drinking fluids    7. CARDIAC HISTORY: \"Do you have any history of heart disease? \" (e.g., heart attack, congestive heart failure)       Denies    8. LUNG HISTORY: \"Do you have any history of lung disease? \"  (e.g., pulmonary embolus, asthma, emphysema)      Denies    9. PE RISK FACTORS: \"Do you have a history of blood clots? \" (or: recent major surgery, recent prolonged travel, bedridden)      Denies    10. OTHER SYMPTOMS: \"Do you have any other symptoms? (e.g., runny nose, wheezing, chest pain)        Runny nose, congestion    11. PREGNANCY: \"Is there any chance you are pregnant? \" \"When was your last menstrual period? \"        Denies    12. TRAVEL: \"Have you traveled out of the country in the last month? \" (e.g., travel history, exposures)        Denies    Protocols used: COUGH-ADULT-[place]    Received call from  at [address] [place] [address]. See above questions and answers. Caller talking full sentences without any distress on phone. Discussed disposition and patient agreeable. Discussed potential consequences for not following disposition recommendation.   Aware to call back with with any concerns or persistent, worsening, or new symptoms develop. Warm transfer to [facility] scheduling for appointment. Aware to go to  if unable to see PCP. Please do not respond to the triage nurse through this encounter. Any subsequent communication should be directly with the patient.

## 2021-07-28 NOTE — DISCHARGE SUMMARY
Attempted to call left message for patient's son to call us back with an update on patient's condition    Obstetrical Discharge Summary     Name: Sissy Sal MRN: [id number]  SSN: [id number]    YOB: 2000  Age: 21 y.o. Sex: female      Admit Date: 2018    Discharge Date: 2018     Admitting Physician: Evette Herr MD     Attending Physician:  Frantz Diaz MD     Admission Diagnoses: Labor, prolonged latent phase    Discharge Diagnoses:   Information for the patient's :  Gill Mulligan [[id number]]   Delivery of a 3.33 kg female infant via Vaginal, Spontaneous Delivery on 2018 at 2:16 PM  by . Apgars were 7 and 9. Additional Diagnoses:   Hospital Problems  Date Reviewed: 2018          Codes Class Noted POA    Pain ICD-10-CM: R52  ICD-9-CM: 780.96  2018 Unknown    Overview Signed 2018  9:46 AM by Martin Jones MD     Perineal laceration              * (Principal)Uterine contractions during pregnancy ICD-10-CM: O62.2  ICD-9-CM: 661.20  2018 Yes             Lab Results   Component Value Date/Time    Rubella, External Immune 2017    GrBStrep, External Positive 2018       Immunization(s):   Immunization History   Administered Date(s) Administered    HPV (Quad) 2013        Hospital Course: Normal hospital course following the delivery. Patient Instructions:   Current Discharge Medication List      START taking these medications    Details   ibuprofen (MOTRIN) 800 mg tablet Take 1 Tab by mouth every eight (8) hours. Indications: perineal pain  Qty: 30 Tab, Refills: 0      oxyCODONE-acetaminophen (PERCOCET) 5-325 mg per tablet Take 1 Tab by mouth every four (4) hours as needed for Pain. Max Daily Amount: 6 Tabs. Indications: Pain, postpartum  Qty: 12 Tab, Refills: 0    Associated Diagnoses: Pain         CONTINUE these medications which have NOT CHANGED    Details   valACYclovir (VALTREX) 500 mg tablet Take 500 mg by mouth two (2) times a day.       prenatal multivit-ca-min-fe-fa (PRENATAL VITAMIN) tab Take 1 Tab by mouth daily. Take with food  Qty: 30 Tab, Refills: 0             Please make followup appointment for 2 weeks with Dr. Kaylynn Aquino  Pelvic rest for 6 weeks  Pain medication as prescribed. Follow-up Appointments   Procedures    FOLLOW UP VISIT Appointment in: Two Weeks     Standing Status:   Standing     Number of Occurrences:   1     Order Specific Question:   Appointment in     Answer:    Two Weeks        Signed By:  Duc Farrell MD     May 11, 2018